# Patient Record
Sex: FEMALE | URBAN - NONMETROPOLITAN AREA
[De-identification: names, ages, dates, MRNs, and addresses within clinical notes are randomized per-mention and may not be internally consistent; named-entity substitution may affect disease eponyms.]

---

## 2023-12-29 ENCOUNTER — OFFICE VISIT (OUTPATIENT)
Dept: URBAN - NONMETROPOLITAN AREA CLINIC 2 | Facility: CLINIC | Age: 58
End: 2023-12-29

## 2024-01-02 ENCOUNTER — OFFICE VISIT (OUTPATIENT)
Dept: URBAN - METROPOLITAN AREA CLINIC 54 | Facility: CLINIC | Age: 59
End: 2024-01-02

## 2024-01-02 ENCOUNTER — LAB OUTSIDE AN ENCOUNTER (OUTPATIENT)
Dept: URBAN - METROPOLITAN AREA CLINIC 54 | Facility: CLINIC | Age: 59
End: 2024-01-02

## 2024-01-02 ENCOUNTER — OFFICE VISIT (OUTPATIENT)
Dept: URBAN - METROPOLITAN AREA CLINIC 54 | Facility: CLINIC | Age: 59
End: 2024-01-02
Payer: COMMERCIAL

## 2024-01-02 ENCOUNTER — DASHBOARD ENCOUNTERS (OUTPATIENT)
Age: 59
End: 2024-01-02

## 2024-01-02 VITALS
BODY MASS INDEX: 20.25 KG/M2 | TEMPERATURE: 97.3 F | HEIGHT: 66 IN | DIASTOLIC BLOOD PRESSURE: 65 MMHG | HEART RATE: 108 BPM | WEIGHT: 126 LBS | SYSTOLIC BLOOD PRESSURE: 111 MMHG

## 2024-01-02 DIAGNOSIS — B19.20 HEPATITIS C VIRUS INFECTION WITHOUT HEPATIC COMA, UNSPECIFIED CHRONICITY: ICD-10-CM

## 2024-01-02 DIAGNOSIS — F15.11 H/O AMPHETAMINE ABUSE: ICD-10-CM

## 2024-01-02 DIAGNOSIS — Z85.3 HISTORY OF BREAST CANCER: ICD-10-CM

## 2024-01-02 DIAGNOSIS — R18.8 OTHER ASCITES: ICD-10-CM

## 2024-01-02 DIAGNOSIS — K76.9 LIVER LESION: ICD-10-CM

## 2024-01-02 DIAGNOSIS — K74.60 UNSPECIFIED CIRRHOSIS OF LIVER: ICD-10-CM

## 2024-01-02 PROBLEM — 300331000: Status: ACTIVE | Noted: 2024-01-02

## 2024-01-02 PROBLEM — 389026000: Status: ACTIVE | Noted: 2024-01-02

## 2024-01-02 PROBLEM — 19943007: Status: ACTIVE | Noted: 2024-01-02

## 2024-01-02 PROBLEM — 429087003: Status: ACTIVE | Noted: 2024-01-02

## 2024-01-02 PROCEDURE — 99205 OFFICE O/P NEW HI 60 MIN: CPT | Performed by: PHYSICIAN ASSISTANT

## 2024-01-02 RX ORDER — NAPROXEN 500 MG/1
1 TABLET WITH FOOD OR MILK AS NEEDED TABLET ORAL
Status: ACTIVE | COMMUNITY

## 2024-01-02 RX ORDER — POLYETHYLENE GLYCOL 3350 17 G/17G
1 PACKET MIXED WITH 8 OUNCES OF FLUID POWDER, FOR SOLUTION ORAL ONCE A DAY
Status: ACTIVE | COMMUNITY

## 2024-01-02 RX ORDER — HYDROCODONE BITARTRATE AND ACETAMINOPHEN 5; 325 MG/1; MG/1
1 TABLET AS NEEDED TABLET ORAL
Status: ACTIVE | COMMUNITY

## 2024-01-02 RX ORDER — FUROSEMIDE 40 MG/1
1 TABLET TABLET ORAL ONCE A DAY
Status: ACTIVE | COMMUNITY

## 2024-01-02 RX ORDER — SPIRONOLACTONE 100 MG/1
1 TABLET TABLET, FILM COATED ORAL ONCE A DAY
Status: ACTIVE | COMMUNITY

## 2024-01-02 RX ORDER — INSULIN ASPART 100 [IU]/ML
AS DIRECTED INJECTION, SUSPENSION SUBCUTANEOUS
Status: ACTIVE | COMMUNITY

## 2024-01-02 RX ORDER — PANTOPRAZOLE SODIUM 40 MG/1
1 TABLET TABLET, DELAYED RELEASE ORAL ONCE A DAY
Status: ACTIVE | COMMUNITY

## 2024-01-02 NOTE — HPI-TODAY'S VISIT:
Evita Browne is a 58-year-old female patient with PMH of hepatitis C, breast cancer in remission, methamphetamine abuse, who presents to clinic for hospital follow up. Patient was admitted to [facility] on 12/18/23 - 12/25/23 for cirrhosis of the liver with ascites and liver masses. Patient initally presented to the ED for abdominal distention.  She was seen at an outside facility approximately a month ago with CT C/A/P that showed: Cirrhotic liver.  Several focal hepatic hypodensities are suspicious for neoplasm.  Differential considerations include HCC and hepatic metastatic disease.  Bulky portocaval and retroperitoneal gerald metastasis disease.  Cholelithiasis in the gallbladder neck.  Mild gallbladder wall thickening and enhancement.  Acute or chronic cholecystitis is not excluded.  Gallbladder wall thickening is nonspecific in the setting of ascites.  Moderate volume ascites.Patient was planned for paracentesis and further evaluation of underlying malignancy patient left AMA.  She was also noted to have hyperglycemia during that visit.  After admission patient underwent RUQ US which showed: "Large volume ascites. Cirrhotic liver morphology. Masses within the liver seen on CT are not visualized on sonography. Borderline gallbladder wall thickening with large calcified gallstone. Gallbladder wall thickening may be reactive. If there is concern for acute cholecystitis, HIDA scan can be considered."  Patient underwent paracentesis with removal of 800 cc of serosanguineous fluid on 12/19/2023.  Cytology from fluid did not show any malignant cells. SAAG was 2.0 consistent with pHTN. Negative for SBP. AFP was not elevated.  GI was consulted and assisted with management.  Patient continued to complain of discomfort from abdominal swelling and she underwent a therapeutic paracentesis with 2500 mL of serosanguineous fluid removed.  [facility] also performed colonoscopy inpatient on 12/22/2023 that showed no evidence of cancer or polyps, but prep was limited.  After further discussion with GI and IR, recommendation was for biopsy of one of patient's retroperitoneal nodes which was scheduled outpatient 2/2 upcoming holidays and limited staff.  Hepatitis panel which showed positive hepatitis C antibody and subsequent hepatitis C quantitative NAAT showed [id number] IU/mL.  She was started on Lasix 40 mg p.o. daily and Aldactone 100 mg p.o. daily for large volume ascites. In regards to diabetes, patient noted she was aware that her blood sugars have been elevated in the past, but she has never been formally diagnosed with diabetes and she has never sought treatment.  She has lost a significant amount of weight, more than 100 pounds in the past few months.  This could be related to underlying diabetes, possible underlying malignancy, her methamphetamine use or combination of these issues.  On presentation her blood glucose was 668, but there was no signs of DKA.  Her A1c was greater than 14.0%.  She met with diabetic educator recommended NovoLog 70/30 FlexPen twice daily   In regards to her methamphetamine use, patient reports she has not used in last 5 years except a small amount just prior to arrival at the hospital.

## 2024-01-04 LAB
AMPHETAMINE: 2667
AMPHETAMINES: POSITIVE
MEDMATCH AMPHETAMINES: (no result)
METHAMPHETAMINE: 8976
NOTES AND COMMENTS: (no result)

## 2024-01-06 LAB
A/G RATIO: 0.8
ABSOLUTE BASOPHILS: 53
ABSOLUTE EOSINOPHILS: 74
ABSOLUTE LYMPHOCYTES: 700
ABSOLUTE MONOCYTES: 837
ABSOLUTE NEUTROPHILS: 8936
AFP, SERUM, TUMOR MARKER: 3.6
ALBUMIN: 3.4
ALKALINE PHOSPHATASE: 181
ALT (SGPT): 45
AST (SGOT): 60
BASOPHILS: 0.5
BILIRUBIN, TOTAL: 0.4
BUN/CREATININE RATIO: 39
BUN: 19
CALCIUM: 9.3
CARBON DIOXIDE, TOTAL: 33
CHLORIDE: 97
CREATININE: 0.49
EGFR: 108
EOSINOPHILS: 0.7
GLOBULIN, TOTAL: 4.3
GLUCOSE: 184
HEMATOCRIT: 36.7
HEMOGLOBIN: 12.1
HEPATITIS C VIRAL RNA: (no result)
INR: 1
LYMPHOCYTES: 6.6
MCH: 30.1
MCHC: 33
MCV: 91.3
MONOCYTES: 7.9
MPV: 12.5
NEUTROPHILS: 84.3
PLATELET COUNT: 251
POTASSIUM: 3.4
PROTEIN, TOTAL: 7.7
PT: 11
RDW: 11.8
RED BLOOD CELL COUNT: 4.02
SODIUM: 139
WHITE BLOOD CELL COUNT: 10.6

## 2024-01-23 ENCOUNTER — WEB ENCOUNTER (OUTPATIENT)
Dept: URBAN - METROPOLITAN AREA CLINIC 54 | Facility: CLINIC | Age: 59
End: 2024-01-23

## 2024-01-25 ENCOUNTER — LAB OUTSIDE AN ENCOUNTER (OUTPATIENT)
Dept: URBAN - METROPOLITAN AREA CLINIC 54 | Facility: CLINIC | Age: 59
End: 2024-01-25

## 2024-01-29 ENCOUNTER — LAB OUTSIDE AN ENCOUNTER (OUTPATIENT)
Dept: URBAN - METROPOLITAN AREA CLINIC 54 | Facility: CLINIC | Age: 59
End: 2024-01-29

## 2024-01-29 ENCOUNTER — TELEPHONE ENCOUNTER (OUTPATIENT)
Dept: URBAN - METROPOLITAN AREA CLINIC 54 | Facility: CLINIC | Age: 59
End: 2024-01-29